# Patient Record
Sex: FEMALE | Race: WHITE | NOT HISPANIC OR LATINO
[De-identification: names, ages, dates, MRNs, and addresses within clinical notes are randomized per-mention and may not be internally consistent; named-entity substitution may affect disease eponyms.]

---

## 2018-09-13 PROBLEM — E78.5 DYSLIPIDEMIA: Status: ACTIVE | Noted: 2018-09-13

## 2018-09-13 PROBLEM — I87.2 VENOUS INSUFFICIENCY, PERIPHERAL: Status: ACTIVE | Noted: 2018-09-13

## 2018-09-13 PROBLEM — I34.1 MVP (MITRAL VALVE PROLAPSE): Status: ACTIVE | Noted: 2018-09-13

## 2018-09-13 PROBLEM — E03.9 ACQUIRED HYPOTHYROIDISM: Status: ACTIVE | Noted: 2018-09-13

## 2018-09-13 PROBLEM — M81.0 AGE-RELATED OSTEOPOROSIS WITHOUT CURRENT PATHOLOGICAL FRACTURE: Status: ACTIVE | Noted: 2018-09-13

## 2018-09-13 PROBLEM — I34.0 NON-RHEUMATIC MITRAL REGURGITATION: Status: ACTIVE | Noted: 2018-09-13

## 2018-09-13 PROBLEM — R73.01 IFG (IMPAIRED FASTING GLUCOSE): Status: ACTIVE | Noted: 2018-09-13

## 2018-09-13 PROBLEM — H04.123 DRY EYES: Status: ACTIVE | Noted: 2018-09-13

## 2021-05-05 PROBLEM — R93.1 AGATSTON CAC SCORE, <100: Status: ACTIVE | Noted: 2020-01-20

## 2022-04-28 LAB
ANA PATTERN: ABNORMAL
ANA SCREEN, IFA: POSITIVE
ANA TITER, 60091: ABNORMAL TITER
ANACHOICE(R) SCREEN: POSITIVE
C3 SERPL-MCNC: 93 MG/DL (ref 83–193)
C4 SERPL-MCNC: 23 MG/DL (ref 15–57)
CLIENT CONTACT: NORMAL
CLIENT CONTACT: NORMAL
DNA (DS) ANTIBODY: 14 IU/ML
DNA AB (DS) CRITHIDIA,IFA: NEGATIVE
IMMUNOFIXATION,SERUM: NORMAL
Lab: >60 U/ML (ref 31–60)
Lab: NORMAL
Lab: NORMAL
Lab: NORMAL AI
REPORT ALWAYS MESSAGE SIGNATURE: NORMAL
REPORT ALWAYS MESSAGE SIGNATURE: NORMAL
RPR WITH REFLEX TO TITER, 799: NORMAL
TEST NAME, 435: NORMAL
TEST NAME, 435: NORMAL
THYROGLOBULIN AB,1830278241: <1 IU/ML
THYROID PEROXIDASE (TPO) AB, 006677: 1 IU/ML

## 2022-05-25 PROBLEM — F33.1 MODERATE RECURRENT MAJOR DEPRESSION (HCC): Status: ACTIVE | Noted: 2022-05-25

## 2022-05-25 PROBLEM — I10 PRIMARY HYPERTENSION: Status: ACTIVE | Noted: 2022-05-25

## 2023-05-09 ENCOUNTER — TELEPHONE (OUTPATIENT)
Dept: SCHEDULING | Age: 78
End: 2023-05-09
Payer: COMMERCIAL

## 2023-05-09 NOTE — TELEPHONE ENCOUNTER
Direct Referral to Dr Osborn.     Verna Ba NORMAN 1945   Cardiologist: Dr Romo Phone: [phone]     Patient's  Melvin is contact [phone]     I spoke to him this AM.  He has the echo disks.  We need office note and reports from echos please     Will hold off on CTA for now patients preference to wait until after seen by Dr Osborn.     Please schedule to be seen ASAP, looks like  might work.     Thanks,   Guadalupe

## 2023-05-09 NOTE — TELEPHONE ENCOUNTER
Pt chart has been created. Pt accepted NPV with Dr. Osborn on 5/31 @ 11:30AM. Mailed NPP and reminder.

## 2023-05-18 NOTE — TELEPHONE ENCOUNTER
I spoke to the pt. Pt states that she can not reschedule at the moment and will call when she can.